# Patient Record
Sex: MALE | Race: BLACK OR AFRICAN AMERICAN | NOT HISPANIC OR LATINO | URBAN - METROPOLITAN AREA
[De-identification: names, ages, dates, MRNs, and addresses within clinical notes are randomized per-mention and may not be internally consistent; named-entity substitution may affect disease eponyms.]

---

## 2019-11-08 ENCOUNTER — OFFICE VISIT - HEALTHEAST (OUTPATIENT)
Dept: FAMILY MEDICINE | Facility: CLINIC | Age: 24
End: 2019-11-08

## 2019-11-08 ENCOUNTER — COMMUNICATION - HEALTHEAST (OUTPATIENT)
Dept: TELEHEALTH | Facility: CLINIC | Age: 24
End: 2019-11-08

## 2019-11-08 DIAGNOSIS — R05.9 COUGH: ICD-10-CM

## 2019-11-08 DIAGNOSIS — R50.9 FEVER, UNSPECIFIED FEVER CAUSE: ICD-10-CM

## 2019-11-08 DIAGNOSIS — Z86.13 HISTORY OF MALARIA: ICD-10-CM

## 2019-11-08 DIAGNOSIS — J10.1 INFLUENZA A: ICD-10-CM

## 2019-11-08 DIAGNOSIS — R11.0 NAUSEA: ICD-10-CM

## 2019-11-08 LAB
BASOPHILS # BLD AUTO: 0 THOU/UL (ref 0–0.2)
BASOPHILS NFR BLD AUTO: 0 % (ref 0–2)
DEPRECATED S PYO AG THROAT QL EIA: NORMAL
EOSINOPHIL # BLD AUTO: 0 THOU/UL (ref 0–0.4)
EOSINOPHIL NFR BLD AUTO: 1 % (ref 0–6)
ERYTHROCYTE [DISTWIDTH] IN BLOOD BY AUTOMATED COUNT: 12.4 % (ref 11–14.5)
FLUAV AG SPEC QL IA: ABNORMAL
FLUBV AG SPEC QL IA: ABNORMAL
HCT VFR BLD AUTO: 40.1 % (ref 40–54)
HGB BLD-MCNC: 12.7 G/DL (ref 14–18)
LYMPHOCYTES # BLD AUTO: 0.6 THOU/UL (ref 0.8–4.4)
LYMPHOCYTES NFR BLD AUTO: 9 % (ref 20–40)
MCH RBC QN AUTO: 23.6 PG (ref 27–34)
MCHC RBC AUTO-ENTMCNC: 31.8 G/DL (ref 32–36)
MCV RBC AUTO: 74 FL (ref 80–100)
MONOCYTES # BLD AUTO: 0.7 THOU/UL (ref 0–0.9)
MONOCYTES NFR BLD AUTO: 10 % (ref 2–10)
NEUTROPHILS # BLD AUTO: 5.7 THOU/UL (ref 2–7.7)
NEUTROPHILS NFR BLD AUTO: 80 % (ref 50–70)
PLATELET # BLD AUTO: 192 THOU/UL (ref 140–440)
PMV BLD AUTO: 7.6 FL (ref 7–10)
RBC # BLD AUTO: 5.4 MILL/UL (ref 4.4–6.2)
WBC: 7.1 THOU/UL (ref 4–11)

## 2019-11-08 RX ORDER — BENZONATATE 100 MG/1
CAPSULE ORAL
Qty: 30 CAPSULE | Refills: 0 | Status: SHIPPED | OUTPATIENT
Start: 2019-11-08

## 2019-11-08 RX ORDER — ONDANSETRON 4 MG/1
TABLET, FILM COATED ORAL
Qty: 12 TABLET | Refills: 1 | Status: SHIPPED | OUTPATIENT
Start: 2019-11-08

## 2019-11-08 ASSESSMENT — MIFFLIN-ST. JEOR: SCORE: 1846.02

## 2019-11-09 LAB
GROUP A STREP BY PCR: NORMAL
MALARIA SMEAR BLD: NORMAL

## 2019-11-12 ENCOUNTER — COMMUNICATION - HEALTHEAST (OUTPATIENT)
Dept: FAMILY MEDICINE | Facility: CLINIC | Age: 24
End: 2019-11-12

## 2019-11-14 ENCOUNTER — COMMUNICATION - HEALTHEAST (OUTPATIENT)
Dept: FAMILY MEDICINE | Facility: CLINIC | Age: 24
End: 2019-11-14

## 2021-06-03 VITALS
BODY MASS INDEX: 27.7 KG/M2 | HEIGHT: 70 IN | WEIGHT: 193.5 LBS | DIASTOLIC BLOOD PRESSURE: 49 MMHG | OXYGEN SATURATION: 98 % | TEMPERATURE: 100.5 F | SYSTOLIC BLOOD PRESSURE: 116 MMHG | HEART RATE: 75 BPM | RESPIRATION RATE: 18 BRPM

## 2021-06-03 NOTE — TELEPHONE ENCOUNTER
Patient Returning Call  Reason for call:  lmtcb  Information relayed to patient:  Patient informed of results and follow up plan, he agrees with plan and no further action required.  Patient has additional questions:  No  If YES, what are your questions/concerns:  NA  Okay to leave a detailed message?: No call back needed

## 2021-06-03 NOTE — PROGRESS NOTES
Assessment/Plan:   Fever, unspecified fever cause  History of malaria  Cough  Nausea  Influenza A  Abrupt onset of fever, chills, fatigue, body aches, HA, URI, cough, ST, N/V yesterday and persists today. 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 No travel. 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 Ill appearing and fatigued, ferrara up lying on exam table under a blanket, febrile. Mild improvement with tylenol and zofran in the office. Abdomen soft, no tender spleen pain. Lungs clear. CXR normal. RST negative. Flu test positive for Influenza A. CBC without significant anemia or low platelets. Total WBC WNL. Malaria smear pending.   I think exam and sxs and exposures are consistent with the positive Influenza A. If malaria smear is negative and he is not improving over 3-5 days may need to be reevaluated with CBC and additional malaria testing, possible LFTs also.   We will treat with tamiflu, tessalon perles, tylenol and ibuprofen, zofran and rest.   I discussed red flag symptoms, return precautions to clinic/ER and follow up care with patient/parent.  Expected clinical course, symptomatic cares advised. Questions answered. Patient/parent amenable with plan.  - XR Chest 2 Views  - HM1(CBC and Differential)  - Malaria Smear, Blood  - Influenza A/B Rapid Test  - Rapid Strep A Screen-Throat  - acetaminophen tablet 650 mg (TYLENOL)  - HM1 (CBC with Diff)  - Group A Strep, RNA Direct Detection, Throat  - XR Chest 2 Views  - Influenza A/B Rapid Test  - benzonatate (TESSALON PERLES) 100 MG capsule; 1-2 tabs every 8 hours as needed for cough  Dispense: 30 capsule; Refill: 0  - ondansetron disintegrating tablet 8 mg (ZOFRAN-ODT)  - ondansetron (ZOFRAN) 4 MG tablet; 1-2 every 8-12 hours as needed for nausea  Dispense: 12 tablet; Refill: 1  - oseltamivir (TAMIFLU) 75 MG capsule; Take 1 capsule (75 mg total) by mouth 2 (two) times a day  "for 5 days.  Dispense: 10 capsule; Refill: 0    Sleep, rest, diet as tolerated, push fluids as able in small quantities at a time  Tylenol 1000mg every 6 hours and ibuprofen 400-600mg every 6 hours for pain and fever - may stagger/overlap these  Tamiflu twice a day for 5 days for influenza  Tessalon perles (benzonatate) 1-2 every 8 hours as needed for cough  Zofran (ondansetron) 4-8mg every 8-12 hours as needed for nausea  Contagious for 5 days from onset of illness - note written for work  I will call with malaria smear result  Recheck if worse or no better.     Subjective:      Esdras Kan is a 24 y.o. male who presents with fever. Abrupt onset of fever, chills, fatigue, body aches, HA, URI, cough, ST, N/V yesterday and persists today. 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 No travel. 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 Has not had flu shot yet this year. No rash. Has not been eating well but trying to drink fluids since the illness started. Having nausea now, had emesis overnight. No jaundice, change in stool color or brown urine. Some abdominal pain though not persistent. Has missed work and school today. He has no primary care established here at this time. Is generally healthy other than the history of malaria. Has not taken any medications. NKDA  Never smoker.     Objective:     /49   Pulse 75   Temp 100.5  F (38.1  C) (Oral)   Resp 18   Ht 5' 9.5\" (1.765 m)   Wt 193 lb 8 oz (87.8 kg)   SpO2 98%   BMI 28.17 kg/m      Physical  General Appearance: Alert, cooperative, no distress but ill appearing lying on exam table with ferrara over head, febrile. Initial temp T102.5 before tylenol.    Head: Normocephalic, without obvious abnormality, atraumatic  Eyes: Conjunctivae are normal.   Ears: Normal TMs and external ear canals, both ears  Nose: rhinorrhea and congestion.  Throat: Throat is red " posteriorly.  No exudate.  No significant lesions  Neck: No adenopathy; supple, no signs of meningismus  Lungs: Clear to auscultation bilaterally, respirations unlabored, occ cough, no wheeze or stridor  Heart: Regular rate and rhythm  Abdomen: Soft, non-tender, no masses, no organomegaly, no CVA tenderness with percussion  Extremities: No lower extremity edema, normal tone, no swollen joints  Skin:  no rashes or lesions  Psychiatric: Patient has a normal mood and affect.        Recent Results (from the past 24 hour(s))   HM1 (CBC with Diff)   Result Value Ref Range    WBC 7.1 4.0 - 11.0 thou/uL    RBC 5.40 4.40 - 6.20 mill/uL    Hemoglobin 12.7 (L) 14.0 - 18.0 g/dL    Hematocrit 40.1 40.0 - 54.0 %    MCV 74 (L) 80 - 100 fL    MCH 23.6 (L) 27.0 - 34.0 pg    MCHC 31.8 (L) 32.0 - 36.0 g/dL    RDW 12.4 11.0 - 14.5 %    Platelets 192 140 - 440 thou/uL    MPV 7.6 7.0 - 10.0 fL    Neutrophils % 80 (H) 50 - 70 %    Lymphocytes % 9 (L) 20 - 40 %    Monocytes % 10 2 - 10 %    Eosinophils % 1 0 - 6 %    Basophils % 0 0 - 2 %    Neutrophils Absolute 5.7 2.0 - 7.7 thou/uL    Lymphocytes Absolute 0.6 (L) 0.8 - 4.4 thou/uL    Monocytes Absolute 0.7 0.0 - 0.9 thou/uL    Eosinophils Absolute 0.0 0.0 - 0.4 thou/uL    Basophils Absolute 0.0 0.0 - 0.2 thou/uL   Influenza A/B Rapid Test   Result Value Ref Range    Influenza  A, Rapid Antigen Influenza A antigen detected (!) No Influenza A antigen detected    Influenza B, Rapid Antigen No Influenza B antigen detected No Influenza B antigen detected   Rapid Strep A Screen-Throat   Result Value Ref Range    Rapid Strep A Antigen No Group A Strep detected, presumptive negative No Group A Strep detected, presumptive negative

## 2021-06-03 NOTE — TELEPHONE ENCOUNTER
----- Message from Brenda Delgado MD sent at 11/9/2019  7:35 AM CST -----  Please call to let the patient know that the blood smear showed no malaria parasites. His symptoms can all be explained by the Influenza A he has. However if worse or no better over the next days he should go to the ER or be seen for further evaluation.

## 2021-06-03 NOTE — TELEPHONE ENCOUNTER
Call and left message for patient to return call regarding results. Callback number given.    KOBY Howell    12:55 PM

## 2021-06-03 NOTE — TELEPHONE ENCOUNTER
Patient Returning Call  Reason for call:  Return call  Information relayed to patient:  [facility] on [facility], was informed of information below from;    ----- Message from Brenda Delgado MD sent at 11/9/2019  7:35 AM CST -----  Please call to let the patient know that the blood smear showed no malaria parasites. His symptoms can all be explained by the Influenza A he has. However if worse or no better over the next days he should go to the ER or be seen for further evaluation.  Patient has additional questions:  No  If YES, what are your questions/concerns:  n/a  Okay to leave a detailed message?: No call back needed

## 2021-06-17 NOTE — PATIENT INSTRUCTIONS - HE
Patient Instructions by Brenda Riley MD at 11/8/2019  4:30 PM     Author: Brenda Riley MD Service: -- Author Type: Physician    Filed: 11/8/2019  7:01 PM Encounter Date: 11/8/2019 Status: Addendum    : Brenda Riley MD (Physician)    Related Notes: Original Note by Brenda Riley MD (Physician) filed at 11/8/2019  7:00 PM       Sleep, rest, diet as tolerated, push fluids as able in small quantities at a time  Tylenol 1000mg every 6 hours and ibuprofen 400-600mg every 6 hours for pain and fever - may stagger/overlap these  Tamiflu twice a day for 5 days for influenza  Tessalon perles (benzonatate) 1-2 every 8 hours as needed for cough  Zofran (ondansetron) 4-8mg every 8-12 hours as needed for nausea  Contagious for 5 days from onset of illness - note written for work  I will call with malaria smear result  Recheck if worse or no better.       Patient Education     The Flu (Influenza)     The virus that causes the flu spreads through the air in droplets when someone who has the flu coughs, sneezes, laughs, or talks.   The flu (influenza) is an infection that affects your respiratory tract. This tract is made up of your mouth, nose, and lungs, and the passages between them. Unlike a cold, the flu can make you very ill. And it can lead to pneumonia, a serious lung infection. The flu can have serious complications and even cause death.  Who is at risk for the flu?  Anyone can get the flu. But you are more likely to become infected if you:    Have a weakened immune system    Work in a healthcare setting where you may be exposed to flu germs    Live or work with someone who has the flu    Havent had an annual flu shot  How does the flu spread?  The flu is caused by a virus. The virus spreads through the air in droplets when someone who has the flu coughs, sneezes, laughs, or talks. You can become infected when you inhale these viruses  directly. You can also become infected when you touch a surface on which the droplets have landed and then transfer the germs to your eyes, nose, or mouth. Touching used tissues, or sharing utensils, drinking glasses, or a toothbrush from an infected person can expose you to flu viruses, too.  What are the symptoms of the flu?  Flu symptoms tend to come on quickly and may last a few days to a few weeks. They include:    Fever usually higher than 100.4 F  (38 C) and chills    Sore throat and headache    Dry cough    Runny nose    Tiredness and weakness    Muscle aches  Who is at risk for flu complications?  For some people, the flu can be very serious. The risk for complications is greater for:    Children younger than age 5    Adults ages 65 and older    People with a chronic illness such as diabetes or heart, kidney, or lung disease    People who live in a nursing home or long-term care facility  How is the flu treated?  The flu usually gets better after 7 days or so. In some cases, your healthcare provider may prescribe an antiviral medicine. This may help you get well a little sooner. For the medicine to help, you need to take it as soon as possible (ideally within 48 hours) after your symptoms start. If you develop pneumonia or other serious illness, you may need to stay in the hospital.  Easing flu symptoms    Drink lots of fluids such as water, juice, and warm soup. A good rule is to drink enough so that you urinate your normal amount.    Get plenty of rest.    Ask your healthcare provider what to take for fever and pain.    Call your provider if your fever is 100.4 F (38 C) or higher, or you become dizzy, lightheaded, or short of breath.  Taking steps to protect others    Wash your hands often, especially after coughing or sneezing. Or clean your hands with an alcohol-based hand  containing at least 60% alcohol.    Cough or sneeze into a tissue. Then throw the tissue away and wash your hands. If you  dont have a tissue, cough and sneeze into your elbow.    Stay home until at least 24 hours after you no longer have a fever or chills. Be sure the fever isnt being hidden by fever-reducing medicine.    Dont share food, utensils, drinking glasses, or a toothbrush with others.    Ask your healthcare provider if others in your household should get antiviral medicine to help them preventinfection.  How can the flu be prevented?    One of the best ways to prevent the flu is to get a flu vaccine each year. The CDC recommends that all people 6 months of age and older get a flu vaccine every year. The virus that causes the flu changes from year to year. For that reason, healthcare providers advise getting the flu vaccine each year as soon as it's available in your area. The vaccine is usually given as a shot, which is usually the first choice of experts. Other forms like a nasal spray or needle-free vaccine are available for some people. Your healthcare provider can tell you which vaccine is right for you.    Wash your hands often. Frequent handwashing is a proven way to help prevent infection.    Carry an alcohol-based hand gel containing at least 60% alcohol. Use it when you can't use soap and water. Then wash your hands as soon as you can.    Try not to touch your eyes, nose, or mouth.    At home and work, clean phones, computer keyboards, and toys often with disinfectant wipes.    If possible, don't have close contact with others who have the flu or symptoms of the flu.  Handwashing tips  Handwashing is one of the best ways to prevent many common infections. If you are caring for or visiting someone with the flu, wash your hands each time you enter and leave the room. Follow these steps:    Use warm water and plenty of soap. Rub your hands together well.    Clean the whole hand, including under your nails, between your fingers, and up the wrists.    Wash for at least 15 seconds.    Rinse, letting the water run down your  fingers, not up your wrists.    Dry your hands well. Use a paper towel to turn off the faucet and open the door.  Using alcohol-based hand   Alcohol-based hand  are also a good choice. Use them when you can't use soap and water. Follow these steps:    Squeeze about a tablespoon of gel into the palm of one hand.    Rub your hands together briskly, cleaning the backs of your hands, the palms, between your fingers, and up the wrists.    Rub until the gel is gone and your hands are completely dry.  Preventing the flu in healthcare settings  The flu is a special concern for people in hospitals and long-term care facilities. To help prevent the spread of flu, many hospitals and nursing homes take these steps:    Healthcare providers wash their hands or use an alcohol-based hand  before and after treating each patient.    People with the flu have private rooms and bathrooms or share a room with someone with the same infection.    People who are at high risk for the flu but don't have it are encouraged to get the flu and pneumonia vaccines.    All healthcare workers are encouraged or required to get flu shots.  Date Last Reviewed: 12/1/2016 2000-2019 The [employer]. [address] [place] [address], [place] [address]. All rights reserved. This information is not intended as a substitute for professional medical care. Always follow your healthcare professional's instructions.

## 2021-06-19 NOTE — LETTER
Letter by Brenda Riley MD at      Author: Brenda Riley MD Service: -- Author Type: --    Filed:  Encounter Date: 11/8/2019 Status: Signed         November 8, 2019     Patient: Esdras Kan   YOB: 1995   Date of Visit: 11/8/2019       To Whom it May Concern:    Esdras Kan was seen in my clinic on 11/8/2019. He was diagnosed with influenza A today and will be contagious for 5 days from onset of illness.  May return to school and work 11/13/19 if no fever    If you have any questions or concerns, please don't hesitate to call.    Sincerely,         Electronically signed by Brenda Coker MD        Patient Education          buspirone  Pronunciation:  radha WELLERKAVITA jones  Brand: BuSpar  What is the most important information I should know about buspirone? Do not use buspirone if you have taken an MAO inhibitor in the past 14 days. A dangerous drug interaction could occur. MAO inhibitors include isocarboxazid, linezolid, methylene blue injection, phenelzine, rasagiline, selegiline, and tranylcypromine. What is buspirone? Buspirone is an anti-anxiety medicine that affects chemicals in the brain that may be unbalanced in people with anxiety. Buspirone is used to treat symptoms of anxiety, such as fear, tension, irritability, dizziness, pounding heartbeat, and other physical symptoms. Buspirone is not an anti-psychotic medication and should not be used in place of medication prescribed by your doctor for mental illness. Buspirone may also be used for purposes not listed in this medication guide. What should I discuss with my healthcare provider before taking buspirone? You should not use buspirone if you are allergic to it. Do not use buspirone if you have taken an MAO inhibitor in the past 14 days. A dangerous drug interaction could occur. MAO inhibitors include isocarboxazid, linezolid, methylene blue injection, phenelzine, rasagiline, selegiline, and tranylcypromine. To make sure buspirone is safe for you, tell your doctor if you have any of these conditions:  · kidney disease; or  · liver disease. Buspirone is not expected to harm an unborn baby. Tell your doctor if you are pregnant or plan to become pregnant during treatment. It is not known whether buspirone passes into breast milk or if it could harm a nursing baby. Tell your doctor if you are breast-feeding a baby. Buspirone is not approved for use by anyone younger than 25years old. How should I take buspirone? Follow all directions on your prescription label.  Your doctor may occasionally change your dose to make sure you get the best licensed healthcare practitioners in caring for their patients and/or to serve consumers viewing this service as a supplement to, and not a substitute for, the expertise, skill, knowledge and judgment of healthcare practitioners. The absence of a warning for a given drug or drug combination in no way should be construed to indicate that the drug or drug combination is safe, effective or appropriate for any given patient. [facility] does not assume any responsibility for any aspect of healthcare administered with the aid of information [facility] provides. The information contained herein is not intended to cover all possible uses, directions, precautions, warnings, drug interactions, allergic reactions, or adverse effects. If you have questions about the drugs you are taking, check with your doctor, nurse or pharmacist.  Copyright 6871-0122 [address]. Version: 5.01. Revision date: 12/14/2015. Care instructions adapted under license by [facility] ([facility]). If you have questions about a medical condition or this instruction, always ask your healthcare professional. [place] [address] any warranty or liability for your use of this information.